# Patient Record
Sex: FEMALE | Race: WHITE | NOT HISPANIC OR LATINO | Employment: FULL TIME | URBAN - METROPOLITAN AREA
[De-identification: names, ages, dates, MRNs, and addresses within clinical notes are randomized per-mention and may not be internally consistent; named-entity substitution may affect disease eponyms.]

---

## 2019-07-24 DIAGNOSIS — Z00.00 ROUTINE GENERAL MEDICAL EXAMINATION AT A HEALTH CARE FACILITY: Primary | ICD-10-CM

## 2019-08-23 NOTE — PROGRESS NOTES
Subjective:      Patient ID: Noel Ag is a 34 y.o. female.    Chief Complaint: [employer]      HPI  Pt here for first year of [employer] physical with [employer].  Anxiety doing well on meds.  Started wellbutrin lately to help stop smoking.  Lots stress.  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  No f/c/sw/cough.  No exertional cp/sob/palp.  Saw Cardiologist in past for fast heart rate, work up was negative.  Off caffeine now.  BMs and urine normal.      Past Medical History:   Diagnosis Date    Allergic rhinitis     Anxiety and depression     Endometriosis     Exercise-induced asthma     Kidney stones     Preeclampsia        Past Surgical History:   Procedure Laterality Date    BILATERAL TUBAL LIGATION       SECTION      x2    CHOLECYSTECTOMY      laparoscopic    ENDOMETRIAL ABLATION      KIDNEY STONE SURGERY      has had 4 stents placed.    LAPAROSCOPIC LYSIS OF ADHESIONS      TONSILLECTOMY         SH:  Smoking less than 1ppd, occas EtOH, , .    FH:  Pat gfather bladder cancer, great aunt with breast CA twice.      HM:  2016 Td, 2019 Gyn Dr. Shields.    Review of Systems   Constitutional: Negative for appetite change, chills, diaphoresis and fever.   HENT: Negative for congestion, ear pain, rhinorrhea, sinus pressure and sore throat.    Respiratory: Negative for cough, chest tightness and shortness of breath.    Cardiovascular: Negative for chest pain and palpitations.   Gastrointestinal: Negative for blood in stool, constipation, diarrhea, nausea and vomiting.   Genitourinary: Negative for dysuria, frequency, hematuria, menstrual problem, urgency and vaginal discharge.   Musculoskeletal: Negative for arthralgias.   Skin: Negative for rash.   Neurological: Negative for dizziness and headaches.   Psychiatric/Behavioral: Negative for sleep disturbance. The patient is not nervous/anxious.          Objective:   /76   Pulse 69   Temp 97.6 °F (36.4 °C)  "(Temporal)   Ht 5' 6" (1.676 m)   Wt 95 kg (209 lb 7 oz)   BMI 33.80 kg/m²     Physical Exam   Constitutional: She is oriented to person, place, and time. She appears well-developed and well-nourished.   HENT:   Right Ear: External ear normal. Tympanic membrane is not injected.   Left Ear: External ear normal. Tympanic membrane is not injected.   Mouth/Throat: Oropharynx is clear and moist.   Eyes: Conjunctivae are normal.   Neck: Normal range of motion. Neck supple. No thyromegaly present.   Cardiovascular: Normal rate, regular rhythm and intact distal pulses. Exam reveals no gallop and no friction rub.   No murmur heard.  Pulmonary/Chest: Effort normal and breath sounds normal. She has no wheezes. She has no rales.   Abdominal: Soft. Bowel sounds are normal. She exhibits no mass. There is no tenderness.   Musculoskeletal: She exhibits no edema.   Lymphadenopathy:     She has no cervical adenopathy.   Neurological: She is alert and oriented to person, place, and time.   Skin: Skin is warm. No rash noted.   Psychiatric: She has a normal mood and affect.       Results for orders placed or performed in visit on 09/06/19   Comprehensive metabolic panel   Result Value Ref Range    Sodium 141 136 - 145 mmol/L    Potassium 3.8 3.5 - 5.1 mmol/L    Chloride 106 95 - 110 mmol/L    CO2 28 23 - 29 mmol/L    Glucose 87 70 - 110 mg/dL    BUN, Bld 9 6 - 20 mg/dL    Creatinine 0.8 0.5 - 1.4 mg/dL    Calcium 9.2 8.7 - 10.5 mg/dL    Total Protein 6.7 6.0 - 8.4 g/dL    Albumin 3.8 3.5 - 5.2 g/dL    Total Bilirubin 0.8 0.1 - 1.0 mg/dL    Alkaline Phosphatase 55 55 - 135 U/L    AST 11 10 - 40 U/L    ALT 7 (L) 10 - 44 U/L    Anion Gap 7 (L) 8 - 16 mmol/L    eGFR if African American >60 >60 mL/min/1.73 m^2    eGFR if non African American >60 >60 mL/min/1.73 m^2   CBC Without Differential   Result Value Ref Range    WBC 5.15 3.90 - 12.70 K/uL    RBC 4.21 4.00 - 5.40 M/uL    Hemoglobin 13.7 12.0 - 16.0 g/dL    Hematocrit 40.3 37.0 - " 48.5 %    Mean Corpuscular Volume 96 82 - 98 fL    Mean Corpuscular Hemoglobin 32.5 (H) 27.0 - 31.0 pg    Mean Corpuscular Hemoglobin Conc 34.0 32.0 - 36.0 g/dL    RDW 11.7 11.5 - 14.5 %    Platelets 339 150 - 350 K/uL    MPV 9.9 9.2 - 12.9 fL   Lipid panel   Result Value Ref Range    Cholesterol 169 120 - 199 mg/dL    Triglycerides 66 30 - 150 mg/dL    HDL 57 40 - 75 mg/dL    LDL Cholesterol 98.8 63.0 - 159.0 mg/dL    Hdl/Cholesterol Ratio 33.7 20.0 - 50.0 %    Total Cholesterol/HDL Ratio 3.0 2.0 - 5.0    Non-HDL Cholesterol 112 mg/dL       CXR and EKG normal.    Assessment:       1. Encounter for preventive health examination    2. Seasonal allergic rhinitis due to pollen    3. Anxiety and depression    4. Endometriosis    5. Kidney stones    6. Pre-eclampsia in third trimester          Plan:     Encounter for preventive health examination- Report results when available.    Seasonal allergic rhinitis due to pollen  -     fluticasone propionate (FLONASE) 50 mcg/actuation nasal spray; 2 sprays (100 mcg total) by Each Nostril route once daily.  Dispense: 16 g; Refill: 6    Anxiety and depression- doing well on meds.

## 2019-09-05 NOTE — PROGRESS NOTES
"Nutrition Assessment  Client name:  Noel Ag  :  1985  Age:  34 y.o.  Gender:  female    Client states:  Client's  works for [employer]. She is a . She has been up to 303 lbs. She was down to 150 lbs, but has been under stress and has gained weight. Pt's family is obese. Pt lost wt by eating less and exercising. No processed foods, sugary drinks, junk foods. Ate whole grains, fruit and vegetables. Pt is actively trying to lose weight again - not eating late, eating more consistently and not skipping meals.     Anthropometrics  Height:  5'6"     Weight:  209 lbs  BMI:  33.8  % Body Fat:  34.6    Clinical Signs/Symptoms  N/V/D:  None   Appetite (Good, Fair, or Poor):  good      No past medical history on file.    No past surgical history on file.    Medications    currently has no medications in their medication list.    Vitamins, Minerals, and/or Supplements:  none     Food/Medication Interactions:  Reviewed     Food Allergies or Intolerances:  none     Social History    Marital status:    Employment:  Works for [employer] as a     Social History     Tobacco Use    Smoking status: Not on file   Substance Use Topics    Alcohol use: Not on file        Lab Reports   Total Cholesterol:  169    Triglycerides:  66  HDL:  57  LDL:  98   Glucose:  87  HbA1c:  4.6  BP:  117/76     Food History  Breakfast:  2 eggs, water  Mid-morning Snack:  none  Lunch:  Protein granola bar, water   Mid-afternoon Snack:  None   Dinner:  Turkey cheese wrap, water   H.S. 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    Intervention    RMR (Method:  [place]):  1668 kcal  Activity Factor:  1.2  IRIS:  2000    Goals:  1.  Increase exercise to 5 days/wk  2.  Eat whole grains and avoid processed foods  3.  Increase portions of non-starchy vegetables    Nutrition Education  Reviewed plate method for meal planning and demonstrated using food models to show appropriate portion sizes. Reviewed diet and provided suggestions/alternatives where needed. Discussed timing of meals and importance of consistent meals throughout the day and benefits of balanced meals. Discussed calorie needs. Discussed difference in saturated and unsaturated fats. Encouraged pt to maintain good intake of non-starchy vegetables at meals as well as whole grains. Introduced to Eat Fit kayleigh to locate healthy eating options area of residence or work. Encouraged client to make heart healthy food choices when eating out.    Reviewed handouts for pt to take home. Answered pt's questions.  Patient verbalized understanding of nutrition education and recommendations received.    Handouts Provided  Meal Planning Guide  Restaurant Guide  Eat Fit Shopping List  Eat Fit Sherie  Fast Food Guide  Vitamin/Mineral Guide    Monitoring/Evaluation    Monitor the following:  Weight  BMI  % Body Fat  Caloric intake  Labs:  annual    Follow Up Plan:  Communication with referring healthcare provider is unnecessary at this time as patient presented as part of annual wellness exam.  However, will follow up with patient in 1-2 years.

## 2019-09-06 ENCOUNTER — HOSPITAL ENCOUNTER (OUTPATIENT)
Dept: RADIOLOGY | Facility: HOSPITAL | Age: 34
Discharge: HOME | End: 2019-09-06
Attending: INTERNAL MEDICINE
Payer: COMMERCIAL

## 2019-09-06 ENCOUNTER — NUTRITION (OUTPATIENT)
Dept: NUTRITION | Facility: CLINIC | Age: 34
End: 2019-09-06
Payer: COMMERCIAL

## 2019-09-06 ENCOUNTER — OFFICE VISIT (OUTPATIENT)
Dept: INTERNAL MEDICINE | Facility: CLINIC | Age: 34
End: 2019-09-06
Payer: COMMERCIAL

## 2019-09-06 ENCOUNTER — CLINICAL SUPPORT (OUTPATIENT)
Dept: REHABILITATION | Facility: HOSPITAL | Age: 34
End: 2019-09-06
Payer: COMMERCIAL

## 2019-09-06 ENCOUNTER — CLINICAL SUPPORT (OUTPATIENT)
Dept: INTERNAL MEDICINE | Facility: CLINIC | Age: 34
End: 2019-09-06
Payer: COMMERCIAL

## 2019-09-06 ENCOUNTER — CLINICAL SUPPORT (OUTPATIENT)
Dept: CARDIOLOGY | Facility: CLINIC | Age: 34
End: 2019-09-06
Payer: COMMERCIAL

## 2019-09-06 VITALS
WEIGHT: 209.44 LBS | HEART RATE: 69 BPM | BODY MASS INDEX: 33.66 KG/M2 | RESPIRATION RATE: 16 BRPM | SYSTOLIC BLOOD PRESSURE: 117 MMHG | HEIGHT: 66 IN | DIASTOLIC BLOOD PRESSURE: 76 MMHG

## 2019-09-06 VITALS
BODY MASS INDEX: 33.66 KG/M2 | WEIGHT: 209.44 LBS | TEMPERATURE: 98 F | HEIGHT: 66 IN | SYSTOLIC BLOOD PRESSURE: 117 MMHG | DIASTOLIC BLOOD PRESSURE: 76 MMHG | HEART RATE: 69 BPM

## 2019-09-06 DIAGNOSIS — F32.A ANXIETY AND DEPRESSION: ICD-10-CM

## 2019-09-06 DIAGNOSIS — J30.1 SEASONAL ALLERGIC RHINITIS DUE TO POLLEN: ICD-10-CM

## 2019-09-06 DIAGNOSIS — O14.93 PRE-ECLAMPSIA IN THIRD TRIMESTER: ICD-10-CM

## 2019-09-06 DIAGNOSIS — Z00.00 ROUTINE GENERAL MEDICAL EXAMINATION AT A HEALTH CARE FACILITY: ICD-10-CM

## 2019-09-06 DIAGNOSIS — Z00.00 ENCOUNTER FOR PREVENTIVE HEALTH EXAMINATION: Primary | ICD-10-CM

## 2019-09-06 DIAGNOSIS — Z00.00 ROUTINE GENERAL MEDICAL EXAMINATION AT A HEALTH CARE FACILITY: Primary | ICD-10-CM

## 2019-09-06 DIAGNOSIS — N20.0 KIDNEY STONES: ICD-10-CM

## 2019-09-06 DIAGNOSIS — N80.9 ENDOMETRIOSIS: ICD-10-CM

## 2019-09-06 DIAGNOSIS — F41.9 ANXIETY AND DEPRESSION: ICD-10-CM

## 2019-09-06 LAB
ALBUMIN SERPL BCP-MCNC: 3.8 G/DL (ref 3.5–5.2)
ALP SERPL-CCNC: 55 U/L (ref 55–135)
ALT SERPL W/O P-5'-P-CCNC: 7 U/L (ref 10–44)
ANION GAP SERPL CALC-SCNC: 7 MMOL/L (ref 8–16)
AST SERPL-CCNC: 11 U/L (ref 10–40)
BILIRUB SERPL-MCNC: 0.8 MG/DL (ref 0.1–1)
BUN SERPL-MCNC: 9 MG/DL (ref 6–20)
CALCIUM SERPL-MCNC: 9.2 MG/DL (ref 8.7–10.5)
CHLORIDE SERPL-SCNC: 106 MMOL/L (ref 95–110)
CHOLEST SERPL-MCNC: 169 MG/DL (ref 120–199)
CHOLEST/HDLC SERPL: 3 {RATIO} (ref 2–5)
CO2 SERPL-SCNC: 28 MMOL/L (ref 23–29)
CREAT SERPL-MCNC: 0.8 MG/DL (ref 0.5–1.4)
ERYTHROCYTE [DISTWIDTH] IN BLOOD BY AUTOMATED COUNT: 11.7 % (ref 11.5–14.5)
EST. GFR  (AFRICAN AMERICAN): >60 ML/MIN/1.73 M^2
EST. GFR  (NON AFRICAN AMERICAN): >60 ML/MIN/1.73 M^2
ESTIMATED AVG GLUCOSE: 85 MG/DL (ref 68–131)
GLUCOSE SERPL-MCNC: 87 MG/DL (ref 70–110)
HBA1C MFR BLD HPLC: 4.6 % (ref 4–5.6)
HCT VFR BLD AUTO: 40.3 % (ref 37–48.5)
HDLC SERPL-MCNC: 57 MG/DL (ref 40–75)
HDLC SERPL: 33.7 % (ref 20–50)
HGB BLD-MCNC: 13.7 G/DL (ref 12–16)
HIV 1+2 AB+HIV1 P24 AG SERPL QL IA: NEGATIVE
LDLC SERPL CALC-MCNC: 98.8 MG/DL (ref 63–159)
MCH RBC QN AUTO: 32.5 PG (ref 27–31)
MCHC RBC AUTO-ENTMCNC: 34 G/DL (ref 32–36)
MCV RBC AUTO: 96 FL (ref 82–98)
NONHDLC SERPL-MCNC: 112 MG/DL
PLATELET # BLD AUTO: 339 K/UL (ref 150–350)
PMV BLD AUTO: 9.9 FL (ref 9.2–12.9)
POTASSIUM SERPL-SCNC: 3.8 MMOL/L (ref 3.5–5.1)
PROT SERPL-MCNC: 6.7 G/DL (ref 6–8.4)
RBC # BLD AUTO: 4.21 M/UL (ref 4–5.4)
SODIUM SERPL-SCNC: 141 MMOL/L (ref 136–145)
TRIGL SERPL-MCNC: 66 MG/DL (ref 30–150)
TSH SERPL DL<=0.005 MIU/L-ACNC: 3.52 UIU/ML (ref 0.4–4)
WBC # BLD AUTO: 5.15 K/UL (ref 3.9–12.7)

## 2019-09-06 PROCEDURE — 93005 EKG 12-LEAD: ICD-10-PCS | Mod: S$GLB,,, | Performed by: INTERNAL MEDICINE

## 2019-09-06 PROCEDURE — 85027 COMPLETE CBC AUTOMATED: CPT

## 2019-09-06 PROCEDURE — 93010 ELECTROCARDIOGRAM REPORT: CPT | Mod: S$GLB,,, | Performed by: INTERNAL MEDICINE

## 2019-09-06 PROCEDURE — 97750 PHYSICAL PERFORMANCE TEST: CPT | Performed by: PHYSICAL THERAPIST

## 2019-09-06 PROCEDURE — 97802 PR MED NUTR THER, 1ST, INDIV, EA 15 MIN: ICD-10-PCS | Mod: S$GLB,,, | Performed by: DIETITIAN, REGISTERED

## 2019-09-06 PROCEDURE — 80053 COMPREHEN METABOLIC PANEL: CPT

## 2019-09-06 PROCEDURE — 99999 PR PBB SHADOW E&M-EST. PATIENT-LVL III: ICD-10-PCS | Mod: PBBFAC,,, | Performed by: INTERNAL MEDICINE

## 2019-09-06 PROCEDURE — 71046 XR CHEST PA AND LATERAL: ICD-10-PCS | Mod: 26,,, | Performed by: RADIOLOGY

## 2019-09-06 PROCEDURE — 99385 PREV VISIT NEW AGE 18-39: CPT | Mod: S$GLB,,, | Performed by: INTERNAL MEDICINE

## 2019-09-06 PROCEDURE — 84443 ASSAY THYROID STIM HORMONE: CPT

## 2019-09-06 PROCEDURE — 83036 HEMOGLOBIN GLYCOSYLATED A1C: CPT

## 2019-09-06 PROCEDURE — 97802 MEDICAL NUTRITION INDIV IN: CPT | Mod: S$GLB,,, | Performed by: DIETITIAN, REGISTERED

## 2019-09-06 PROCEDURE — 71046 X-RAY EXAM CHEST 2 VIEWS: CPT | Mod: TC

## 2019-09-06 PROCEDURE — 80061 LIPID PANEL: CPT

## 2019-09-06 PROCEDURE — 99385 PR PREVENTIVE VISIT,NEW,18-39: ICD-10-PCS | Mod: S$GLB,,, | Performed by: INTERNAL MEDICINE

## 2019-09-06 PROCEDURE — 71046 X-RAY EXAM CHEST 2 VIEWS: CPT | Mod: 26,,, | Performed by: RADIOLOGY

## 2019-09-06 PROCEDURE — 99999 PR PBB SHADOW E&M-EST. PATIENT-LVL III: CPT | Mod: PBBFAC,,, | Performed by: INTERNAL MEDICINE

## 2019-09-06 PROCEDURE — 93005 ELECTROCARDIOGRAM TRACING: CPT | Mod: S$GLB,,, | Performed by: INTERNAL MEDICINE

## 2019-09-06 PROCEDURE — 93010 EKG 12-LEAD: ICD-10-PCS | Mod: S$GLB,,, | Performed by: INTERNAL MEDICINE

## 2019-09-06 PROCEDURE — 86703 HIV-1/HIV-2 1 RESULT ANTBDY: CPT

## 2019-09-06 RX ORDER — FLUTICASONE PROPIONATE 50 MCG
2 SPRAY, SUSPENSION (ML) NASAL DAILY
Qty: 16 G | Refills: 6 | Status: SHIPPED | OUTPATIENT
Start: 2019-09-06

## 2019-09-06 RX ORDER — ALPRAZOLAM 1 MG/1
TABLET ORAL
Refills: 5 | COMMUNITY
Start: 2019-08-21

## 2019-09-06 RX ORDER — FOLIC ACID 1 MG/1
1000 TABLET ORAL DAILY
Refills: 11 | COMMUNITY
Start: 2019-08-21

## 2019-09-06 RX ORDER — BUPROPION HYDROCHLORIDE 150 MG/1
TABLET ORAL
Refills: 5 | COMMUNITY
Start: 2019-08-26

## 2019-09-06 RX ORDER — LISDEXAMFETAMINE DIMESYLATE 70 MG/1
CAPSULE ORAL
Refills: 0 | COMMUNITY
Start: 2019-08-26

## 2019-09-06 NOTE — LETTER
2019    Noel Ag  [address]  [place] [address]             [facility]  [address]  [place] [address]  Phone: [phone]  Fax: [phone] [facility] #[id number]    Dear Ms. Ag:    It was a pleasure to see you and perform your executive  physical on 2018.  At the time of your  visit, you are 34 years old.  You report your anxiety  is doing well on current medications.  You have started  Wellbutrin in addition to help stop smoking.  You have  had lots of ongoing stress.  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  You denied any fevers, sweats, or coughing  problems.  You have had no exertional chest pain,  shortness of breath, or palpitation.  You have seen a  cardiologist in the past for a fast heart rate, which  workup was negative.  Your bowel and bladder function  has been normal.    Your past medical history is significant for allergic  rhinitis, anxiety and depression, endometriosis,  exercise-induced asthma, kidney stones, preeclampsia.   You have had a bilateral tubal ligation,  x2,  laparoscopic cholecystectomy, endometrial ablation,  kidney stone surgery with four stents placed in the  past, laparoscopic lysis of adhesions, and  tonsillectomy.    Your medications include alprazolam, Wellbutrin,  Vyvanse, Allegra, and Flonase.  You have a medication  allergic reaction to ofloxacin, macrolide antibiotics  and morphine.    Your health maintenance includes 2016 tetanus  vaccination; in 2019, gynecologic exam with Dr. Shields.    On physical exam, your height is 5 feet 6 inches,  weight 209 pounds with a body mass index of 33.80.   This is in the overweight range.  Your blood pressure  is 117/76, pulse 69, and temperature 97.6.  Your  general appearance is well developed with no distress.   Your head and neck exam is normal.  Your heart has a  regular rate and rhythm  with no abnormal sounds.  Your  lungs are clear throughout with a normal respiratory  effort.  Your abdomen is soft with normal bowel sounds,  no mass or enlarged organs are noted.  Your extremities  have strong distal pulses with no swelling.    Your labs reveal a normal blood count.  Your platelets  are 339, normal.  Your kidney and liver function is  normal.  Your electrolytes are normal.  Your fasting  glucose is 87, normal.  Total cholesterol is 169, HDL  57, LDL 98.8, and triglyceride 66.  This is an  excellent cholesterol panel.  Hemoglobin A1c 4.6,  normal.  TSH is 3.521, normal.  HIV test is negative.   Your chest x-ray shows clear lungs and a normal size  heart.  Your EKG is normal.    In summary, you appear to be in good general health  overall.  Please continue your exercise to continue  your good cholesterol and low vascular risk.  You are  up to date on all vaccinations and preventive care  needed at this time.  We added Flonase to your allergy  regimen, which will hopefully help you with nasal  drainage and head congestion.    It was a pleasure to meet you and perform your  executive physical.  If I can be of any further  assistance or if you have any other questions or  concerns, please do not hesitate to let me know.    Yours very truly,        Bianka Lewis M.D.        LEXII  dd: 09/09/2019 09:32:59 (CDT)  td: 09/09/2019 22:15:55 (CDT)  Doc ID   #[id number]  Job ID #[id number]    CC:

## 2019-09-06 NOTE — PROGRESS NOTES
:  [birth year]    DX: Z00.0  Orders:  Fitness Evaluation    An [employer] Fitness Component evaluation was completed.  Results are as follows:    Height (in):    66                            Weight (lbs):    209                                    BMI:   33.8    Resting Energy Expenditure:         1430       kcal/24 hrs.              Estimated:    1669     REE measured post treadmill:          No   REE measured fasting:       Yes         Body Composition:          34.61  % body fat             Rating: Poor    Waist to Hip Ratio:     0.74                    Risk:  Low   Hip taken over clothing:      Yes          Muscular Strength and Endurance Assessment:               Strength (lbs):     Right: 78.3             Rating:  Average      Left:  67           Rating: Average   Push-ups:   11                 Rating:  Fair   Curl-ups :   26                Rating:  Average    Flexibility Testing:   Sit and Reach (cm):    36                       Rating:  Very Good    Patient reports losing 136 lbs through diet and exercise though has gained some back.  Knows she needs to get back on track again.  H/O SI joint dysfunction, has received PT.     The patient completed the testing procedures without complications.

## 2024-06-28 DIAGNOSIS — Z00.00 ROUTINE GENERAL MEDICAL EXAMINATION AT A HEALTH CARE FACILITY: Primary | ICD-10-CM

## 2024-08-26 NOTE — PROGRESS NOTES
"Nutrition Assessment  Session Time:  45 minutes      Client name:  Noel Ag  :  1985  Age:  39 y.o.  Gender:  female    Client states:  Spouse of a [employer] employee.  Present for nutrition counseling as part of annual physical.  Last nutrition consult: 2019    Reports stomach and health issues starting 4 months ago: bloating after eating, diarrhea, fatigue.  Someone suggested for her to begin a gluten free diet.  Patient followed diet to the best of her abilities and began feeling better.  Went out of town once and consumed gluten and issues began again.   Back to following a mostly gluten free diet now and symptoms stay away until gluten is consumed.    Works 12 hours shifts and has difficulty finding times/energy to exercise.   Also reports back and hip pain.    Patient with goal to lose weight and tries to follow low carbohydrate diet since that has worked well for her in the past (was once 325 lbs).  Some days doesn't stay mindful of how healthy food is and just consumes whatever as long as it is gluten free.     Reports possibly only consuming 1200 calories on some days.        Anthropometrics, per fitness evaluation 2024  Height:  66"     Weight:  191 lbs  BMI:  33.8  % Body Fat:  34.61%    Clinical Signs/Symptoms  N/V/D:  none noted  Appetite:  good       Past Medical History:   Diagnosis Date    Allergic rhinitis     Anxiety and depression     Endometriosis     Exercise-induced asthma     Kidney stones     Preeclampsia        Past Surgical History:   Procedure Laterality Date    BILATERAL TUBAL LIGATION       SECTION      x2    CHOLECYSTECTOMY      laparoscopic    ENDOMETRIAL ABLATION      KIDNEY STONE SURGERY      has had 4 stents placed.    LAPAROSCOPIC LYSIS OF ADHESIONS      TONSILLECTOMY      TOTAL ABDOMINAL HYSTERECTOMY  2022    O       Medications    has a current medication list which includes the following prescription(s): alprazolam, bupropion, " clotrimazole-betamethasone 1-0.05%, fexofenadine hcl, fluticasone propionate, folic acid, hydromorphone, metformin, mupirocin, ondansetron, ondansetron, promethazine, and vyvanse.    Vitamins, Minerals, and/or Supplements:  not discussed     Food/Medication Interactions:  Reviewed     Food Allergies or Intolerances:  NKFA noted in chart// self reported intolerance to gluten    Social History    Marital status:    Employment:  yes    Social History     Tobacco Use    Smoking status: Former    Smokeless tobacco: Never   Substance Use Topics    Alcohol use: Yes        Lab Reports   Sodium   Date Value Ref Range Status   06/20/2022 136 135 - 144 mmol/L Final     Potassium   Date Value Ref Range Status   06/20/2022 4.1 3.5 - 5.1 mmol/L Final     Chloride   Date Value Ref Range Status   06/20/2022 104 98 - 107 mmol/L Final     CO2   Date Value Ref Range Status   06/20/2022 27 22 - 31 mmol/L Final     Glucose   Date Value Ref Range Status   06/20/2022 90 70 - 110 mg/dL Final     BUN   Date Value Ref Range Status   06/20/2022 12 5 - 17 mg/dL Final     Creatinine   Date Value Ref Range Status   06/20/2022 0.8 0.5 - 1.0 mg/dl Final     Calcium   Date Value Ref Range Status   06/20/2022 8.6 8.4 - 10.2 mg/dL Final     Total Protein   Date Value Ref Range Status   06/20/2022 6.9 6.0 - 7.8 g/dL Final     Albumin   Date Value Ref Range Status   09/06/2019 3.8 3.5 - 5.2 g/dL Final     Total Bilirubin   Date Value Ref Range Status   06/20/2022 0.7 0 - 1.3 mg/dL Final     Alkaline Phosphatase   Date Value Ref Range Status   06/20/2022 51 50 - 150 U/L Final     AST   Date Value Ref Range Status   06/20/2022 17 10 - 50 U/L Final     ALT   Date Value Ref Range Status   06/20/2022 15 <35 U/L Final     Comment:     **NOTE: Effective 1/22/20: ALT result represents a new  improved methodology. The Normal Range has changed  accordingly and previous ALT patient results should  not be compared to the current result.       Anion Gap    Date Value Ref Range Status   06/20/2022 9 8 - 16 mEq/L Final     eGFR if    Date Value Ref Range Status   09/06/2019 >60 >60 mL/min/1.73 m^2 Final     eGFR if non    Date Value Ref Range Status   09/06/2019 >60 >60 mL/min/1.73 m^2 Final     Comment:     Calculation used to obtain the estimated glomerular filtration  rate (eGFR) is the CKD-EPI equation.         Lab Results   Component Value Date    WBC 6.42 06/20/2022    HGB 14.2 06/20/2022    HCT 41.4 06/20/2022    MCV 93.7 06/20/2022     06/20/2022        Lab Results   Component Value Date    CHOL 188 12/06/2023     Lab Results   Component Value Date    HDL 82 12/06/2023     Lab Results   Component Value Date    LDLCALC 98 12/06/2023     Lab Results   Component Value Date    TRIG 37 12/06/2023     Lab Results   Component Value Date    CHOLHDL 33.7 09/06/2019     Lab Results   Component Value Date    HGBA1C 4.7 (L) 12/06/2023     BP Readings from Last 1 Encounters:   09/22/23 134/82       Food History  reviewed    Exercise History:  provided above    Cultural/Spiritual/Personal Preferences:  None identified    Support System:  spouse    State of Change:  Contemplation    Barriers to Change:  time management    Diagnosis    Inadequate energy intake related to inability to lose weight as evidenced by 1200 calorie intake.    Intervention    RMR (Method:  per fitness evaluation 8- ):  1562 kcal  Activity Factor:  1.2    IRIS:  1874 kcal    Goals:  1.  1600 calories per day  2.  Continue gluten free diet   3. Consider local meal prep company, [employer]      Nutrition Education  The following education was provided to the patient:  Discussed weight management.  Suggested dietary modifications based on current dietary behaviors and individual food preferences.  *Lab results were pending at time of consult and so, not discussed with patient.  Dicussed gluten free diet + resources    Patient verbalized understanding of nutrition  education and recommendations received.    Handouts Provided  none    Monitoring/Evaluation    Monitor the following:  Weight  BMI  % Body Fat  Caloric intake  Labs:  lipid panel, glucose, HgbA1c    Follow Up Plan:  Communication with referring healthcare provider is unnecessary at this time as patient presented as part of annual wellness exam.  However, will follow up with patient in 1-2 years.

## 2024-08-27 ENCOUNTER — CLINICAL SUPPORT (OUTPATIENT)
Dept: INTERNAL MEDICINE | Facility: CLINIC | Age: 39
End: 2024-08-27
Payer: COMMERCIAL

## 2024-08-27 ENCOUNTER — CLINICAL SUPPORT (OUTPATIENT)
Dept: INTERNAL MEDICINE | Facility: CLINIC | Age: 39
End: 2024-08-27

## 2024-08-27 ENCOUNTER — CLINICAL SUPPORT (OUTPATIENT)
Dept: REHABILITATION | Facility: HOSPITAL | Age: 39
End: 2024-08-27

## 2024-08-27 ENCOUNTER — HOSPITAL ENCOUNTER (OUTPATIENT)
Dept: CARDIOLOGY | Facility: HOSPITAL | Age: 39
Discharge: HOME | End: 2024-08-27

## 2024-08-27 ENCOUNTER — OFFICE VISIT (OUTPATIENT)
Dept: INTERNAL MEDICINE | Facility: CLINIC | Age: 39
End: 2024-08-27

## 2024-08-27 VITALS
RESPIRATION RATE: 18 BRPM | DIASTOLIC BLOOD PRESSURE: 81 MMHG | TEMPERATURE: 98 F | HEART RATE: 75 BPM | HEIGHT: 66 IN | SYSTOLIC BLOOD PRESSURE: 126 MMHG | WEIGHT: 191 LBS | BODY MASS INDEX: 30.7 KG/M2

## 2024-08-27 DIAGNOSIS — Z00.00 ROUTINE GENERAL MEDICAL EXAMINATION AT A HEALTH CARE FACILITY: Primary | ICD-10-CM

## 2024-08-27 DIAGNOSIS — F32.A ANXIETY AND DEPRESSION: ICD-10-CM

## 2024-08-27 DIAGNOSIS — E66.9 OBESITY (BMI 30.0-34.9): ICD-10-CM

## 2024-08-27 DIAGNOSIS — F98.8 ATTENTION DEFICIT DISORDER, UNSPECIFIED HYPERACTIVITY PRESENCE: ICD-10-CM

## 2024-08-27 DIAGNOSIS — Z00.00 ROUTINE GENERAL MEDICAL EXAMINATION AT A HEALTH CARE FACILITY: ICD-10-CM

## 2024-08-27 DIAGNOSIS — F41.9 ANXIETY AND DEPRESSION: ICD-10-CM

## 2024-08-27 DIAGNOSIS — Z87.442 HISTORY OF NEPHROLITHIASIS: ICD-10-CM

## 2024-08-27 LAB
ALBUMIN SERPL BCP-MCNC: 3.6 G/DL (ref 3.5–5.2)
ALP SERPL-CCNC: 46 U/L (ref 55–135)
ALT SERPL W/O P-5'-P-CCNC: 16 U/L (ref 10–44)
ANION GAP SERPL CALC-SCNC: 9 MMOL/L (ref 8–16)
AST SERPL-CCNC: 13 U/L (ref 10–40)
BILIRUB SERPL-MCNC: 0.7 MG/DL (ref 0.1–1)
BUN SERPL-MCNC: 9 MG/DL (ref 6–20)
CALCIUM SERPL-MCNC: 8.6 MG/DL (ref 8.7–10.5)
CHLORIDE SERPL-SCNC: 108 MMOL/L (ref 95–110)
CHOLEST SERPL-MCNC: 144 MG/DL (ref 120–199)
CHOLEST/HDLC SERPL: 2.6 {RATIO} (ref 2–5)
CO2 SERPL-SCNC: 23 MMOL/L (ref 23–29)
CREAT SERPL-MCNC: 0.7 MG/DL (ref 0.5–1.4)
ERYTHROCYTE [DISTWIDTH] IN BLOOD BY AUTOMATED COUNT: 12.1 % (ref 11.5–14.5)
EST. GFR  (NO RACE VARIABLE): >60 ML/MIN/1.73 M^2
ESTIMATED AVG GLUCOSE: 82 MG/DL (ref 68–131)
GLUCOSE SERPL-MCNC: 72 MG/DL (ref 70–110)
HBA1C MFR BLD: 4.5 % (ref 4–5.6)
HCT VFR BLD AUTO: 36.1 % (ref 37–48.5)
HDLC SERPL-MCNC: 56 MG/DL (ref 40–75)
HDLC SERPL: 38.9 % (ref 20–50)
HGB BLD-MCNC: 12.3 G/DL (ref 12–16)
LDLC SERPL CALC-MCNC: 80.8 MG/DL (ref 63–159)
MCH RBC QN AUTO: 32.1 PG (ref 27–31)
MCHC RBC AUTO-ENTMCNC: 34.1 G/DL (ref 32–36)
MCV RBC AUTO: 94 FL (ref 82–98)
NONHDLC SERPL-MCNC: 88 MG/DL
OHS QRS DURATION: 82 MS
OHS QTC CALCULATION: 438 MS
PLATELET # BLD AUTO: 387 K/UL (ref 150–450)
PMV BLD AUTO: 9.7 FL (ref 9.2–12.9)
POTASSIUM SERPL-SCNC: 3.7 MMOL/L (ref 3.5–5.1)
PROT SERPL-MCNC: 6.2 G/DL (ref 6–8.4)
RBC # BLD AUTO: 3.83 M/UL (ref 4–5.4)
SODIUM SERPL-SCNC: 140 MMOL/L (ref 136–145)
TRIGL SERPL-MCNC: 36 MG/DL (ref 30–150)
TSH SERPL DL<=0.005 MIU/L-ACNC: 2.7 UIU/ML (ref 0.4–4)
WBC # BLD AUTO: 4.64 K/UL (ref 3.9–12.7)

## 2024-08-27 PROCEDURE — 85027 COMPLETE CBC AUTOMATED: CPT | Performed by: FAMILY MEDICINE

## 2024-08-27 PROCEDURE — 97802 MEDICAL NUTRITION INDIV IN: CPT | Mod: S$GLB,,, | Performed by: DIETITIAN, REGISTERED

## 2024-08-27 PROCEDURE — 80061 LIPID PANEL: CPT | Performed by: FAMILY MEDICINE

## 2024-08-27 PROCEDURE — 80053 COMPREHEN METABOLIC PANEL: CPT | Performed by: FAMILY MEDICINE

## 2024-08-27 PROCEDURE — 84443 ASSAY THYROID STIM HORMONE: CPT | Performed by: FAMILY MEDICINE

## 2024-08-27 PROCEDURE — 99211 OFF/OP EST MAY X REQ PHY/QHP: CPT | Mod: S$GLB,,, | Performed by: INTERNAL MEDICINE

## 2024-08-27 PROCEDURE — 83036 HEMOGLOBIN GLYCOSYLATED A1C: CPT | Performed by: FAMILY MEDICINE

## 2024-08-27 PROCEDURE — 99999 PR PBB SHADOW E&M-EST. PATIENT-LVL III: CPT | Mod: PBBFAC,,, | Performed by: FAMILY MEDICINE

## 2024-08-27 PROCEDURE — 93005 ELECTROCARDIOGRAM TRACING: CPT

## 2024-08-27 PROCEDURE — 97750 PHYSICAL PERFORMANCE TEST: CPT | Performed by: PHYSICAL THERAPIST

## 2024-08-27 PROCEDURE — 99385 PREV VISIT NEW AGE 18-39: CPT | Mod: ,,, | Performed by: FAMILY MEDICINE

## 2024-08-27 PROCEDURE — 93010 ELECTROCARDIOGRAM REPORT: CPT | Mod: ,,, | Performed by: INTERNAL MEDICINE

## 2024-08-27 RX ORDER — BUPROPION HYDROCHLORIDE 300 MG/1
300 TABLET ORAL DAILY
COMMUNITY

## 2024-08-27 RX ORDER — IPRATROPIUM BROMIDE 42 UG/1
2 SPRAY, METERED NASAL 2 TIMES DAILY
COMMUNITY
Start: 2024-08-04 | End: 2024-08-27

## 2024-08-27 NOTE — PROGRESS NOTES
:  1985    DX: Z00.0  Orders:  Fitness Evaluation    Patient's second [facility] Fitness Component evaluation was completed.  Results are as follows:    Height (in):    66                            Weight (lbs):    191                                    BMI:   33.8    Resting Energy Expenditure:         1562       kcal/24 hrs.                             Body Composition:          34.61  % body fat             Rating: Fair     Waist to Hip Ratio:     0.73                    Risk:  Low Risk    Hip taken over clothing:      Yes          Muscular Strength and Endurance Assessment:               Strength (lbs):     Right: 73             Rating:  Average     Left:  70           Rating: Average    Push-ups:   0                 Rating:  Needs Improvement   Curl-ups :   55                Rating:  Well Above Average     Flexibility Testing:   Sit and Reach (cm):    35.5                       Rating:  Excellent     Assessment: 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 She says she experiences knee and hip pain. She has had physical therapy and seen a Chiropractor as well but has not done anything recently. 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 Discussed using her pool to help with pain.     Date 2024   Height (in): 66 66   Weight: 191 209   BMI: 30.89 33.80   Body Fat %: 29.33 34.61   Waist (cm): 89.7 95   Hip (cm): 123 128.2   WHR: 0.73 0.74   RBP: 126/81 117/76   RHR: 75 69    Rt (lbs): 73 78    Lt (lbs): 70 67   Push-up  0 11   Curl-up  55 26   Flexibility  36 36   REE  (Kcals) 1562 1430   Final Chart input copied from results page on Excel    The patient completed the testing procedures without complications.

## 2024-08-27 NOTE — PROGRESS NOTES
"Subjective:       Patient ID: Noel Ag is a 39 y.o. female.    Chief Complaint: Executive Health    39-year-old female patient with Patient Active Problem List:     Allergic rhinitis     Anxiety and depression     Endometriosis     ADD (attention deficit disorder)     History of nephrolithiasis     Obesity (BMI 30.0-34.9)  Here for executive health physical  Patient reports that she stays tired all the time, has been working on her diet, with gluten free which has been helpful with abdominal cramping  Denies of any chest pain or difficulty breathing with palpitations  Anxiety has been stable on current medication        Review of Systems   Constitutional:  Negative for fatigue.   Eyes:  Negative for visual disturbance.   Respiratory:  Negative for shortness of breath.    Cardiovascular:  Negative for chest pain and leg swelling.   Gastrointestinal:  Negative for abdominal pain, nausea and vomiting.   Musculoskeletal:  Negative for myalgias.   Skin:  Negative for rash.   Neurological:  Negative for light-headedness and headaches.   Psychiatric/Behavioral:  Positive for decreased concentration. Negative for sleep disturbance. The patient is nervous/anxious.          /81 (BP Location: Left arm, Patient Position: Sitting, BP Method: Large (Automatic))   Pulse 75   Temp 98.4 °F (36.9 °C) (Tympanic)   Resp 18   Ht 5' 6" (1.676 m)   Wt 86.6 kg (191 lb)   LMP  (LMP Unknown)   BMI 30.83 kg/m²   Objective:      Physical Exam  Constitutional:       Appearance: She is well-developed.   HENT:      Head: Normocephalic and atraumatic.   Cardiovascular:      Rate and Rhythm: Normal rate and regular rhythm.      Heart sounds: Normal heart sounds. No murmur heard.  Pulmonary:      Effort: Pulmonary effort is normal.      Breath sounds: Normal breath sounds. No wheezing.   Abdominal:      General: Bowel sounds are normal.      Palpations: Abdomen is soft.      Tenderness: There is no abdominal tenderness.   Skin:    "  General: Skin is warm and dry.      Findings: No rash.   Neurological:      Mental Status: She is alert and oriented to person, place, and time.   Psychiatric:         Mood and Affect: Mood normal.           Assessment/Plan:   1. Routine general medical examination at a health care facility  Vital signs stable today.  Clinical exam stable  Continue lifestyle modifications with low-fat and low-cholesterol diet and exercise 30 minutes daily  Encouraged to take over-the-counter vitamin D3 and B12 supplements 1000 units daily to help with fatigue  Reports being up-to-date with tetanus received in 2016    2. History of nephrolithiasis  Stable    3. Anxiety and depression  Clinically doing well on Wellbutrin 300 mg and Xanax 1 mg    4. Attention deficit disorder, unspecified hyperactivity presence  Currently taking Vyvanse 70 mg daily    5. Obesity (BMI 30.0-34.9)  Lifestyle modifications recommended to lose weight with BMI 30